# Patient Record
Sex: FEMALE | Race: WHITE | HISPANIC OR LATINO | URBAN - METROPOLITAN AREA
[De-identification: names, ages, dates, MRNs, and addresses within clinical notes are randomized per-mention and may not be internally consistent; named-entity substitution may affect disease eponyms.]

---

## 2021-04-09 ENCOUNTER — TELEMEDICINE (OUTPATIENT)
Dept: INTERNAL MEDICINE CLINIC | Facility: CLINIC | Age: 36
End: 2021-04-09

## 2021-04-09 DIAGNOSIS — R51.9 ACUTE NONINTRACTABLE HEADACHE, UNSPECIFIED HEADACHE TYPE: Primary | ICD-10-CM

## 2021-04-09 DIAGNOSIS — Z20.822 EXPOSURE TO COVID-19 VIRUS: ICD-10-CM

## 2021-04-09 DIAGNOSIS — R52 GENERALIZED BODY ACHES: ICD-10-CM

## 2021-04-09 DIAGNOSIS — R05.9 COUGH: ICD-10-CM

## 2021-04-09 DIAGNOSIS — R51.9 ACUTE NONINTRACTABLE HEADACHE, UNSPECIFIED HEADACHE TYPE: ICD-10-CM

## 2021-04-09 PROCEDURE — 99203 OFFICE O/P NEW LOW 30 MIN: CPT | Performed by: PHYSICIAN ASSISTANT

## 2021-04-09 PROCEDURE — U0005 INFEC AGEN DETEC AMPLI PROBE: HCPCS | Performed by: PHYSICIAN ASSISTANT

## 2021-04-09 PROCEDURE — U0003 INFECTIOUS AGENT DETECTION BY NUCLEIC ACID (DNA OR RNA); SEVERE ACUTE RESPIRATORY SYNDROME CORONAVIRUS 2 (SARS-COV-2) (CORONAVIRUS DISEASE [COVID-19]), AMPLIFIED PROBE TECHNIQUE, MAKING USE OF HIGH THROUGHPUT TECHNOLOGIES AS DESCRIBED BY CMS-2020-01-R: HCPCS | Performed by: PHYSICIAN ASSISTANT

## 2021-04-09 NOTE — PROGRESS NOTES
COVID-19 Outpatient Progress Note    Assessment/Plan:    Problem List Items Addressed This Visit     None      Visit Diagnoses     Acute nonintractable headache, unspecified headache type    -  Primary    Relevant Orders    Novel Coronavirus (Covid-19),PCR [facility] - Collected at [facility] or [facility]    Generalized body aches        Relevant Orders    Novel Coronavirus (Covid-19),PCR [facility] - Collected at [facility] or [facility]    Cough        Relevant Orders    Novel Coronavirus (Covid-19),PCR [facility] - Collected at [facility] or [facility]    Exposure to COVID-19 virus        Relevant Orders    Novel Coronavirus (Covid-19),PCR [facility] - Collected at [facility] or [facility]         Disposition:     I referred patient to one of our centralized sites for a COVID-19 swab  14-year-old female presenting today as a new patient for concern of COVID exposure and now with symptoms times 2-3 days  Based on our discussion there is higher suspicion for COVID-19 infection  I have recommended that patient go today to our centralized COVID testing site at [facility] to get tested  Procedure protocol for testing was discussed with patient and she expresses understanding, address of the testing site with given  In the meantime patient understands that she needs to remain home and quarantine as she is at high risk for COVID-19 with exposure  She can continue her medication/anti-inflammatory for her body aches and headache and I also reminded patient to rest and drink plenty of fluids to stay hydrated  She also needs to monitor her symptoms closely      Patient states that she has an appointment for her 1st vaccination for COVID-19 tomorrow and I did advise unfortunately that she canceled this appointment as the vaccine would be an ineffective at this time for this current exposure and possible infection but also we do not want her going out and possibly exposing other people as she needs to remain home and quarantine  Patient expresses understanding of this  We will plan to follow-up with patient when test results are received hopefully early next week  ER precautions were addressed over the weekend if symptoms suddenly worsen and she notes any significant breathing issues or other uncontrollable issues that she has concerns with  Otherwise we will determine further treatment plan once test results are reviewed  I have spent 18 minutes directly with the patient  Greater than 50% of this time was spent in counseling/coordination of care regarding: risks and benefits of treatment options, instructions for management, patient and family education, importance of treatment compliance, risk factor reductions and impressions  Encounter provider Lamont Vieira PA-C    Provider located at [id number] [place] [address] Road  [id number] [place] [address] 30  [place] [id number] [facility] [address] [phone]    Recent Visits  No visits were found meeting these conditions  Showing recent visits within past 7 days and meeting all other requirements     Today's Visits  Date Type Provider Dept   04/09/21 207 [facility], [address] [place] [address] today's visits and meeting all other requirements     Future Appointments  No visits were found meeting these conditions  Showing future appointments within next 150 days and meeting all other requirements      This virtual check-in was done via [employer] and patient was informed that this is a secure, HIPAA-compliant platform  She agrees to proceed  Patient agrees to participate in a virtual check in via telephone or video visit instead of presenting to the office to address urgent/immediate medical needs  Patient is aware this is a billable service  After connecting through [facility], the patient was identified by name and date of birth   Eugenia Alicia was informed that this was a telemedicine visit and that the exam was being conducted confidentially over secure lines  My office door was closed  No one else was in the room  The patient was notified the following individuals were present in the room: [facility]  telephone  used for visit today  Wendy Carmona acknowledged consent and understanding of privacy and security of the telemedicine visit  I informed the patient that I have reviewed her record in Epic and presented the opportunity for her to ask any questions regarding the visit today  The patient agreed to participate  Subjective:   Wendy Carmona is a 39 y o  female who is concerned about COVID-19  Patient's symptoms include chills, fatigue, sore throat, cough, myalgias and headache  Patient denies congestion, rhinorrhea, anosmia, loss of taste, shortness of breath, chest tightness, abdominal pain, nausea, vomiting and diarrhea  Date of symptom onset: 4/7/2021  Date of exposure: 4/6/2021    Exposure:   Contact with a person who is under investigation (PUI) for or who is positive for COVID-19 within the last 14 days?: Yes    Hospitalized recently for fever and/or lower respiratory symptoms?: No      Currently a healthcare worker that is involved in direct patient care?: No      Works in a special setting where the risk of COVID-19 transmission may be high? (this may include long-term care, correctional and prison facilities; homeless shelters; assisted-living facilities and group homes ): No      Resident in a special setting where the risk of COVID-19 transmission may be high? (this may include long-term care, correctional and prison facilities; homeless shelters; assisted-living facilities and group homes ): No      States was exposed to her family member who tested positive, found out yesterday, 4/8/2021 of the + test   GUS  IS A NEW PATIENT    Unable to check temp because does not have a thermometer  She is taking a medication naproxen for body pain and head pain       [facility], [facility] Masood Porras  History reviewed  No pertinent past medical history  History reviewed  No pertinent surgical history  No current outpatient medications on file  No current facility-administered medications for this visit  No Known Allergies    Review of Systems   Constitutional: Positive for chills and fatigue  HENT: Positive for sore throat  Negative for congestion and rhinorrhea  Respiratory: Positive for cough  Negative for chest tightness and shortness of breath  Gastrointestinal: Negative for abdominal pain, diarrhea, nausea and vomiting  Musculoskeletal: Positive for myalgias  Neurological: Positive for headaches  Objective:    Vitals:       Physical Exam  Constitutional:       General: She is not in acute distress  Appearance: She is ill-appearing (mild)  She is not toxic-appearing or diaphoretic  HENT:      Head: Normocephalic and atraumatic  Right Ear: External ear normal       Left Ear: External ear normal    Eyes:      General:         Right eye: No discharge  Left eye: No discharge  Conjunctiva/sclera: Conjunctivae normal    Neck:      Musculoskeletal: No pain with movement  Pulmonary:      Effort: Pulmonary effort is normal    Neurological:      Mental Status: She is alert and oriented to person, place, and time  Psychiatric:         Mood and Affect: Mood normal          Behavior: Behavior normal  Behavior is cooperative  VIRTUAL VISIT DISCLAIMER    Jeyson Sanders acknowledges that she has consented to an online visit or consultation  She understands that the online visit is based solely on information provided by her, and that, in the absence of a face-to-face physical evaluation by the physician, the diagnosis she receives is both limited and provisional in terms of accuracy and completeness  This is not intended to replace a full medical face-to-face evaluation by the physician   Jeyson Sanders understands and accepts these terms

## 2021-04-11 LAB — SARS-COV-2 RNA RESP QL NAA+PROBE: POSITIVE

## 2021-04-12 ENCOUNTER — TELEMEDICINE (OUTPATIENT)
Dept: INTERNAL MEDICINE CLINIC | Facility: CLINIC | Age: 36
End: 2021-04-12

## 2021-04-12 VITALS — TEMPERATURE: 97.3 F

## 2021-04-12 DIAGNOSIS — U07.1 COVID-19 VIRUS INFECTION: Primary | ICD-10-CM

## 2021-04-12 PROCEDURE — 99212 OFFICE O/P EST SF 10 MIN: CPT | Performed by: PHYSICIAN ASSISTANT

## 2021-04-12 NOTE — PROGRESS NOTES
COVID-19 Outpatient Progress Note    Assessment/Plan:    Problem List Items Addressed This Visit     None      Visit Diagnoses     COVID-19 virus infection    -  Primary         Disposition:     I recommended continued isolation until at least 24 hours have passed since recovery defined as resolution of fever without the use of fever-reducing medications AND improvement in COVID symptoms AND 10 days have passed since onset of symptoms (or 10 days have passed since date of first positive viral diagnostic test for asymptomatic patients)  Patient is a very pleasant 39-year-old female who tested positive for COVID on 04/09, exposure 4/6 and symptoms starting 4/7  Today she states that she is doing okay with symptoms as described in HPI however she does report some slight improvement overall  She has been quarantine to in her home appropriately, stating that all others in the home are positive for COVID as well  She declines any further treatment for her symptoms and will stick with Tylenol as needed for headache and pain control  Patient reminded of importance to rest as well as keeping hydrated with clear fluids  She declined any medication for her nasal congestion  Patient overall is stable and I do not see any further need for any other treatment at this time  Today is day 5 of symptoms and again we will continue to monitor  I will have my medical assistant reach out to patient to schedule her 2nd COVID follow-up in the next 48-72 hours  Patient was encouraged to contact our office with any sooner questions or sudden changes in symptoms  Patient expressed understanding of our discussion today  At next follow-up we will discuss prognosis as well as vaccination recommendations  I have spent 15 minutes directly with the patient   Greater than 50% of this time was spent in counseling/coordination of care regarding: diagnostic results, risks and benefits of treatment options, instructions for management, patient and family education, importance of treatment compliance and impressions  Encounter provider Laura Robin PA-C    Provider located at [address] [facility] [address]  [address] [place] [address] 30  [facility] [[email or web address]] [address] [facility] [address] [phone]    Recent Visits  Date Type Provider Dept   04/09/21 207 [facility], [facility] recent visits within past 7 days and meeting all other requirements     Today's Visits  Date Type Provider Dept   04/12/21 207 [facility], [address] [place] [address] today's visits and meeting all other requirements     Future Appointments  No visits were found meeting these conditions  Showing future appointments within next 150 days and meeting all other requirements      This virtual check-in was done via [employer] and patient was informed that this is a secure, HIPAA-compliant platform  She agrees to proceed  Patient agrees to participate in a virtual check in via telephone or video visit instead of presenting to the office to address urgent/immediate medical needs  Patient is aware this is a billable service  After connecting through [facility], the patient was identified by name and date of birth  Fransisco Crum was informed that this was a telemedicine visit and that the exam was being conducted confidentially over secure lines  My office door was closed  No one else was in the room  The patient was notified the following individuals were present in the room: [employer] telephone  used for todays visit  Fransisco Crum acknowledged consent and understanding of privacy and security of the telemedicine visit  I informed the patient that I have reviewed her record in Epic and presented the opportunity for her to ask any questions regarding the visit today  The patient agreed to participate  Subjective:    Fransisco Crum is a 39 y o  female who has been screened for COVID-19  Symptom change since last report: improving  Patient's symptoms include fatigue, nasal congestion, sore throat, anosmia, loss of taste, cough, chest tightness and headache  Patient denies fever, chills, rhinorrhea, shortness of breath, abdominal pain, nausea, vomiting and diarrhea  Cally Linares has been staying home and has isolated themselves in her home  She is taking care to not share personal items and is cleaning all surfaces that are touched often, like counters, tabletops, and doorknobs using household cleaning sprays or wipes  She is wearing a mask when she leaves her room  Date of symptom onset: 4/7/2021  Date of exposure: 4/6/2021  Date of positive COVID-19 PCR: 4/9/2021    She states everyone at the house is also positive  She is taking tylenol for sxs  Lab Results   Component Value Date    SARSCOV2 Positive (A) 04/09/2021     History reviewed  No pertinent past medical history  History reviewed  No pertinent surgical history  No current outpatient medications on file  No current facility-administered medications for this visit  No Known Allergies    Review of Systems   Constitutional: Positive for fatigue  Negative for chills and fever  HENT: Positive for congestion and sore throat  Negative for rhinorrhea  Respiratory: Positive for cough and chest tightness  Negative for shortness of breath  Gastrointestinal: Negative for abdominal pain, diarrhea, nausea and vomiting  Neurological: Positive for headaches  Objective:    Vitals:    04/12/21 0820   Temp: (!) 97 3 °F (36 3 °C)   TempSrc: Temporal       Physical Exam  Constitutional:       General: She is not in acute distress  Appearance: She is ill-appearing (mild)  She is not toxic-appearing  HENT:      Head: Normocephalic and atraumatic  Right Ear: External ear normal       Left Ear: External ear normal    Eyes:      General:         Right eye: No discharge  Left eye: No discharge  Conjunctiva/sclera: Conjunctivae normal    Neck:      Musculoskeletal: No pain with movement  Pulmonary:      Effort: Pulmonary effort is normal    Neurological:      Mental Status: She is alert and oriented to person, place, and time  Psychiatric:         Mood and Affect: Mood normal          Speech: Speech normal          Behavior: Behavior normal  Behavior is cooperative  VIRTUAL VISIT DISCLAIMER    Salena Noah acknowledges that she has consented to an online visit or consultation  She understands that the online visit is based solely on information provided by her, and that, in the absence of a face-to-face physical evaluation by the physician, the diagnosis she receives is both limited and provisional in terms of accuracy and completeness  This is not intended to replace a full medical face-to-face evaluation by the physician  Salena Zmaora understands and accepts these terms

## 2021-04-15 ENCOUNTER — TELEPHONE (OUTPATIENT)
Dept: INTERNAL MEDICINE CLINIC | Facility: CLINIC | Age: 36
End: 2021-04-15

## 2021-04-15 NOTE — TELEPHONE ENCOUNTER
Marlo - MA tried reaching patient for virtual appt, unsuccessful, left VM  I texted pt through [employer] and waited about 8minutes, then had [employer] telephone  attempt to call pt  Unable to reach pt, [facility]  leave detailed message that I was calling for her appt and asked she give office a call to reschedule  Please attempt to reach out to patient to reschedule appt if she does not call back       TY

## 2021-04-15 NOTE — TELEPHONE ENCOUNTER
Patient stated her phone was lost and could not find it  Phone was found and appt was rescheduled for 4/16/21

## 2021-04-16 ENCOUNTER — TELEMEDICINE (OUTPATIENT)
Dept: INTERNAL MEDICINE CLINIC | Facility: CLINIC | Age: 36
End: 2021-04-16

## 2021-04-16 DIAGNOSIS — R07.89 CHEST TIGHTNESS: ICD-10-CM

## 2021-04-16 DIAGNOSIS — U07.1 COVID-19 VIRUS INFECTION: Primary | ICD-10-CM

## 2021-04-16 DIAGNOSIS — R05.9 COUGH: ICD-10-CM

## 2021-04-16 PROCEDURE — 99214 OFFICE O/P EST MOD 30 MIN: CPT | Performed by: PHYSICIAN ASSISTANT

## 2021-04-16 RX ORDER — ACETAMINOPHEN 325 MG/1
650 TABLET ORAL EVERY 6 HOURS PRN
COMMUNITY
End: 2021-05-26

## 2021-04-16 RX ORDER — BENZONATATE 200 MG/1
200 CAPSULE ORAL 3 TIMES DAILY PRN
Qty: 30 CAPSULE | Refills: 0 | Status: SHIPPED | OUTPATIENT
Start: 2021-04-16 | End: 2021-05-26

## 2021-04-16 RX ORDER — PREDNISONE 20 MG/1
40 TABLET ORAL DAILY
Qty: 10 TABLET | Refills: 0 | Status: SHIPPED | OUTPATIENT
Start: 2021-04-16 | End: 2021-04-21

## 2021-04-16 NOTE — PROGRESS NOTES
COVID-19 Outpatient Progress Note    Assessment/Plan:    Problem List Items Addressed This Visit     None      Visit Diagnoses     COVID-19 virus infection    -  Primary    Relevant Medications    benzonatate (TESSALON) 200 MG capsule    predniSONE 20 mg tablet    Chest tightness        Relevant Medications    predniSONE 20 mg tablet    Cough        Relevant Medications    benzonatate (TESSALON) 200 MG capsule         Disposition:     I recommended continued isolation until at least 24 hours have passed since recovery defined as resolution of fever without the use of fever-reducing medications AND improvement in COVID symptoms AND 10 days have passed since onset of symptoms (or 10 days have passed since date of first positive viral diagnostic test for asymptomatic patients)  Patient is a pleasant 58-year-old female presenting today for her 2nd COVID follow-up  Today is day 9 from the reported start of her symptoms and day 7 from her positive test   Unfortunately patient is noting slightly worse cough and some tightness in her chest as well as new onset abdominal discomfort generalized as well as some diarrhea  However patient states overall she thinks she is slowly improving  Based on my assessment I do not feel that she needs any further evaluation in the emergency department or through the [facility], limited to the virtual video today  However, I would recommend a cough medicine and prednisone as prescribed above  She will continue Tylenol as needed  She can continue her inhaler from [place] as well as she feels it is helpful  Rest and fluids for hydration were all encouraged as well as continued efforts for isolation  Tentative last day of isolation set for Monday, 4/19/21  I have advised 1 more follow-up with patient to reassess her respiratory symptoms early next week before we clear her      I will discuss potential immunity, prognosis of COVID and the vaccination with her in more detail at next follow-up  Patient did have question regarding when she could get the vaccine which I told her she could look into getting scheduled her vaccination as soon as we clear her post infection  Patient understands the need to go to emergency department over the weekend should her respiratory symptoms continue to worsen despite treatment  Otherwise again we will contact her Monday or Tuesday for another virtual follow-up  I have spent 18 minutes directly with the patient  Greater than 50% of this time was spent in counseling/coordination of care regarding: diagnostic results, prognosis, risks and benefits of treatment options, instructions for management, patient and family education, importance of treatment compliance and impressions  Encounter provider Kelyl Carlos PA-C    Provider located at [id number] [place] [address] Road  [address] [place] [address] [address] [facility] [id number] [phone]    Recent Visits  Date Type Provider Dept   04/15/21 Telephone Kelly Carlos, [facility]   04/12/21 207 [facility], [facility]   04/09/21 207 [facility], [address] [place] [address] recent visits within past 7 days and meeting all other requirements     Today's Visits  Date Type Provider Dept   04/16/21 207 [facility], [address] [place] [address] today's visits and meeting all other requirements     Future Appointments  No visits were found meeting these conditions  Showing future appointments within next 150 days and meeting all other requirements      This virtual check-in was done via [employer] and patient was informed that this is a secure, HIPAA-compliant platform  She agrees to proceed  Patient agrees to participate in a virtual check in via telephone or video visit instead of presenting to the office to address urgent/immediate medical needs   Patient is aware this is a billable service  After connecting through [facility], the patient was identified by name and date of birth  Jose Juan Salazar was informed that this was a telemedicine visit and that the exam was being conducted confidentially over secure lines  My office door was closed  No one else was in the room  The patient was notified the following individuals were present in the room: [employer] telephone  used for today's appt  Jose Juan Salazar acknowledged consent and understanding of privacy and security of the telemedicine visit  I informed the patient that I have reviewed her record in Epic and presented the opportunity for her to ask any questions regarding the visit today  The patient agreed to participate  Subjective: Jose Juan Salazar is a 39 y o  female who has been screened for COVID-19  Symptom change since last report: improving  Patient's symptoms include fatigue, anosmia, loss of taste, cough, shortness of breath, chest tightness, abdominal pain (entire abdomen), diarrhea, myalgias (mild) and headache (daily)  Patient denies fever, chills, congestion, rhinorrhea, sore throat, nausea and vomiting  David Goss has been staying home and has isolated themselves in her home  She is taking care to not share personal items and is cleaning all surfaces that are touched often, like counters, tabletops, and doorknobs using household cleaning sprays or wipes  She is wearing a mask when she leaves her room  Date of symptom onset: 4/7/2021  Date of exposure: 4/6/2021  Date of positive COVID-19 PCR: 4/9/2021    She is currently using  Tylenol  She also is using an inhaler - betamethasone (from her counter of [employer])  She does find the inhaler is helping  Lab Results   Component Value Date    SARSCOV2 Positive (A) 04/09/2021     History reviewed  No pertinent past medical history  History reviewed  No pertinent surgical history    Current Outpatient Medications   Medication Sig Dispense Refill    acetaminophen (TYLENOL) 325 mg tablet Take 650 mg by mouth every 6 (six) hours as needed for mild pain      benzonatate (TESSALON) 200 MG capsule Take 1 capsule (200 mg total) by mouth 3 (three) times a day as needed for cough 30 capsule 0    predniSONE 20 mg tablet Take 2 tablets (40 mg total) by mouth daily for 5 days 10 tablet 0     No current facility-administered medications for this visit  No Known Allergies    Review of Systems   Constitutional: Positive for fatigue  Negative for chills and fever  HENT: Negative for congestion, rhinorrhea and sore throat  Respiratory: Positive for cough, chest tightness and shortness of breath  Gastrointestinal: Positive for abdominal pain (entire abdomen) and diarrhea  Negative for nausea and vomiting  Musculoskeletal: Positive for myalgias (mild)  Neurological: Positive for headaches (daily)  Objective: There were no vitals filed for this visit  Physical Exam  Constitutional:       General: She is not in acute distress  Appearance: She is ill-appearing (mild)  She is not toxic-appearing  HENT:      Head: Normocephalic and atraumatic  Right Ear: External ear normal       Left Ear: External ear normal    Eyes:      Conjunctiva/sclera: Conjunctivae normal    Neck:      Musculoskeletal: No pain with movement  Pulmonary:      Effort: No tachypnea, bradypnea, accessory muscle usage or respiratory distress  Comments: Intermittent dry hacking cough  No obvious respiratory distress, talking normally  Neurological:      Mental Status: She is alert and oriented to person, place, and time  Psychiatric:         Mood and Affect: Mood normal          Behavior: Behavior normal  Behavior is cooperative  VIRTUAL VISIT DISCLAIMER    Thai Myers acknowledges that she has consented to an online visit or consultation   She understands that the online visit is based solely on information provided by her, and that, in the absence of a face-to-face physical evaluation by the physician, the diagnosis she receives is both limited and provisional in terms of accuracy and completeness  This is not intended to replace a full medical face-to-face evaluation by the physician  Sadi Vital understands and accepts these terms

## 2021-04-19 ENCOUNTER — TELEMEDICINE (OUTPATIENT)
Dept: INTERNAL MEDICINE CLINIC | Facility: CLINIC | Age: 36
End: 2021-04-19

## 2021-04-19 ENCOUNTER — TELEPHONE (OUTPATIENT)
Dept: INTERNAL MEDICINE CLINIC | Facility: CLINIC | Age: 36
End: 2021-04-19

## 2021-04-19 DIAGNOSIS — U07.1 COVID-19 VIRUS INFECTION: Primary | ICD-10-CM

## 2021-04-19 PROCEDURE — 99212 OFFICE O/P EST SF 10 MIN: CPT | Performed by: PHYSICIAN ASSISTANT

## 2021-04-19 NOTE — TELEPHONE ENCOUNTER
Clerical staff  Pt seen for covid telemedicine visits, never seen in office  Pt agreeable to having non-urgent general preventative physical as a new patient in office in the next month or 2  Please reach out to schedule this   TY

## 2021-04-19 NOTE — PROGRESS NOTES
COVID-19 Outpatient Progress Note    Assessment/Plan:    Problem List Items Addressed This Visit        Other    RESOLVED: COVID-19 virus infection - Primary         Disposition:     Pleasant 39-year-old female presenting today for another COVID follow-up as she had reported a worsening cough and some chest tightness on day 9 from the reported start of her symptoms, day 7 from her positive test   Patient prescribed benzonatate and prednisone and today she states she is feeling much better with mild residual intermittent dry cough, chest tightness resolved  She has no other symptoms  Based on my assessment, today is day 10 from her positive test, so today will be her last day of isolation  Patient expresses understanding  Discussed with patient potential for immunity which can last up to 90 days post infection  She expresses understanding that it is less likely to be reinfected within this 90 day window  However, she understands the need to continue to comply with CDC recommendations to stop spread of COVID including wearing face mask appropriately, social distancing, germ precautions and proper hand hygiene  Patient expresses understanding that COVID vaccine is highly recommended at any time following her isolation and she states that she does not need recommendations of where she could get scheduled for the vaccine  No further follow-up necessary  However we have not seen her for new patient visit here in the office  Patient agreeable to getting scheduled for an annual physical as a new patient within the next 1-2 months and I will have our clerical staff contact her to arrange for this visit  She is to call sooner with any concerns  I have spent 10 minutes directly with the patient   Greater than 50% of this time was spent in counseling/coordination of care regarding: diagnostic results, prognosis, risks and benefits of treatment options, instructions for management, patient and family education, importance of treatment compliance, risk factor reductions and impressions  Encounter provider Omer Arriola PA-C    Provider located at [address] [facility] [address]  [id number] [place] [address] [address] [facility] [id number] [phone]    Recent Visits  Date Type Provider Dept   04/16/21 Telemedicine Jadielaure Arriola, [facility]   04/15/21 Telephone Jadielaure Mirandar, [facility]   04/12/21 [address] [facility], [address] [place] [address] recent visits within past 7 days and meeting all other requirements     Today's Visits  Date Type Provider Dept   04/19/21 Telephone Omer Curranvandanar, [address]   04/19/21 [address] [facility], [address] [place] [address] today's visits and meeting all other requirements     Future Appointments  No visits were found meeting these conditions  Showing future appointments within next 150 days and meeting all other requirements      This virtual check-in was done via [employer] and patient was informed that this is a secure, HIPAA-compliant platform  She agrees to proceed  Patient agrees to participate in a virtual check in via telephone or video visit instead of presenting to the office to address urgent/immediate medical needs  Patient is aware this is a billable service  After connecting through [facility], the patient was identified by name and date of birth  Thai Myers was informed that this was a telemedicine visit and that the exam was being conducted confidentially over secure lines  My office door was closed  No one else was in the room  The patient was notified the following individuals were present in the room: [employer] telephone  used for todays visit  Thai Myers acknowledged consent and understanding of privacy and security of the telemedicine visit   I informed the patient that I have reviewed her record in [address] [place] [address] and presented the opportunity for her to ask any questions regarding the visit today  The patient agreed to participate  Subjective: Colton Smith is a 39 y o  female who has been screened for COVID-19  Symptom change since last report: resolving  Patient is currently asymptomatic  Patient's symptoms include cough (mild, significantly improved)  Patient denies fever, chills, fatigue, malaise, congestion, rhinorrhea, sore throat, anosmia, loss of taste, shortness of breath, chest tightness, abdominal pain, nausea, vomiting, diarrhea, myalgias and headaches  Cally Linares has been staying home and has isolated themselves in her home  She is taking care to not share personal items and is cleaning all surfaces that are touched often, like counters, tabletops, and doorknobs using household cleaning sprays or wipes  She is wearing a mask when she leaves her room  Date of symptom onset: 4/7/2021  Date of exposure: 4/6/2021  Date of positive COVID-19 PCR: 4/9/2021    Last telemed f/u 4/16 and pt reporting some coughing and chest tightness  Prednisone and benzonatate cough pills prescribed  Lab Results   Component Value Date    SARSCOV2 Positive (A) 04/09/2021     Past Medical History:   Diagnosis Date    COVID-19 virus infection 4/19/2021     History reviewed  No pertinent surgical history  Current Outpatient Medications   Medication Sig Dispense Refill    benzonatate (TESSALON) 200 MG capsule Take 1 capsule (200 mg total) by mouth 3 (three) times a day as needed for cough 30 capsule 0    predniSONE 20 mg tablet Take 2 tablets (40 mg total) by mouth daily for 5 days 10 tablet 0    acetaminophen (TYLENOL) 325 mg tablet Take 650 mg by mouth every 6 (six) hours as needed for mild pain       No current facility-administered medications for this visit  No Known Allergies    Review of Systems   Constitutional: Negative for chills, fatigue and fever     HENT: Negative for congestion, rhinorrhea and sore throat  Respiratory: Positive for cough (mild, significantly improved)  Negative for chest tightness and shortness of breath  Gastrointestinal: Negative for abdominal pain, diarrhea, nausea and vomiting  Musculoskeletal: Negative for myalgias  Neurological: Negative for headaches  Objective:    Vitals:       Physical Exam  Constitutional:       General: She is not in acute distress  Appearance: She is not ill-appearing or toxic-appearing  HENT:      Head: Normocephalic and atraumatic  Eyes:      Conjunctiva/sclera: Conjunctivae normal    Neck:      Musculoskeletal: No pain with movement  Trachea: Phonation normal    Pulmonary:      Effort: Pulmonary effort is normal    Neurological:      Mental Status: She is alert and oriented to person, place, and time  Psychiatric:         Mood and Affect: Mood normal          Speech: Speech normal          Behavior: Behavior normal  Behavior is cooperative  VIRTUAL VISIT DISCLAIMER    Evert Telma acknowledges that she has consented to an online visit or consultation  She understands that the online visit is based solely on information provided by her, and that, in the absence of a face-to-face physical evaluation by the physician, the diagnosis she receives is both limited and provisional in terms of accuracy and completeness  This is not intended to replace a full medical face-to-face evaluation by the physician  Evert Hollis understands and accepts these terms

## 2021-05-26 ENCOUNTER — OFFICE VISIT (OUTPATIENT)
Dept: INTERNAL MEDICINE CLINIC | Facility: CLINIC | Age: 36
End: 2021-05-26

## 2021-05-26 VITALS
TEMPERATURE: 98 F | SYSTOLIC BLOOD PRESSURE: 112 MMHG | BODY MASS INDEX: 31.41 KG/M2 | WEIGHT: 184 LBS | HEIGHT: 64 IN | HEART RATE: 74 BPM | DIASTOLIC BLOOD PRESSURE: 70 MMHG

## 2021-05-26 DIAGNOSIS — Z13.0 SCREENING FOR DEFICIENCY ANEMIA: ICD-10-CM

## 2021-05-26 DIAGNOSIS — Z00.00 ROUTINE ADULT HEALTH MAINTENANCE: Primary | ICD-10-CM

## 2021-05-26 DIAGNOSIS — E66.09 CLASS 1 OBESITY DUE TO EXCESS CALORIES WITHOUT SERIOUS COMORBIDITY WITH BODY MASS INDEX (BMI) OF 31.0 TO 31.9 IN ADULT: ICD-10-CM

## 2021-05-26 DIAGNOSIS — E03.9 HYPOTHYROIDISM, UNSPECIFIED TYPE: ICD-10-CM

## 2021-05-26 DIAGNOSIS — Z13.1 SCREENING FOR DIABETES MELLITUS: ICD-10-CM

## 2021-05-26 DIAGNOSIS — Z01.419 ROUTINE GYNECOLOGICAL EXAMINATION: ICD-10-CM

## 2021-05-26 DIAGNOSIS — Z13.220 SCREENING, LIPID: ICD-10-CM

## 2021-05-26 PROCEDURE — 99385 PREV VISIT NEW AGE 18-39: CPT | Performed by: PHYSICIAN ASSISTANT

## 2021-05-26 RX ORDER — LORATADINE 10 MG/1
10 TABLET ORAL DAILY
COMMUNITY

## 2021-05-26 NOTE — PROGRESS NOTES
Assessment/Plan:      Diagnoses and all orders for this visit:    Routine adult health maintenance    Routine gynecological examination  -     Ambulatory referral to Obstetrics / Gynecology; Future    Hypothyroidism, unspecified type  -     TSH, 3rd generation with Free T4 reflex; Future    Class 1 obesity due to excess calories without serious comorbidity with body mass index (BMI) of 31 0 to 31 9 in adult    Screening for deficiency anemia  -     CBC and differential; Future    Screening for diabetes mellitus  -     Comprehensive metabolic panel; Future    Screening, lipid  -     Lipid panel; Future    Other orders  -     loratadine (CLARITIN) 10 mg tablet; Take 10 mg by mouth daily      patient is a 65-year-old female who is presenting to me today to establish with our office, previously seen in recent past for telemedicine visit for COVID-19 infection  She has noted intermittent dry cough but no other symptoms  I would recommend she consider starting taking Claritin every day instead of p r n  As this may be allergy related  She can use cough medicine such as Delsym OTC as needed and recommend staying hydrated  Her lungs are clear on examination today and I do not see any other need for respiratory intervention but do recommend continued monitoring  Patient does note a history of hypothyroidism but has intermittently been using thyroid medication that was prescribed in [facility]  She states that she has not had any routine testing to follow-up for her thyroid in about 3 years and last had thyroid medication approximately November 2020  She is not complaining of any current symptoms  I would recommend updating TSH and free T4 before we advised starting medication again  Patient is agreeable  BMI addressed today currently 31 58    Patient understands concern of risks associated with obesity which include increased risk for certain cancers, chronic pain as well as heart disease, stroke and heart attack as well as diabetes  Patient understands healthy BMI less than 25  Recommendations for diet changes and exercise as documented below  PHQ is negative today  Routine lab work including CBC, CMP and lipids ordered in addition to her thyroid blood test   HIV screening deferred today since patient is self pay however will consider addressing this at a later time when I have more time to discuss other options for testing through free STD clinic  Information given for patient to schedule dental and eye examination  She will be scheduled for her COVID vaccine before leaving the office today  Therefore will defer Tdap to next appointment  Referral provided for gyn for routine care as well as consideration for cervical cancer screening  For now we will have patient follow-up on an as-needed basis, recommended yearly for annual physical     [employer] telephone  used for todays appt  Chief Complaint   Patient presents with    Establish Care     cough from having covid a month ago           Subjective:     Patient ID: Santiago Ratliff is a 39 y o  female  35y/o female here today for establishment with office as NP, only seen for telemedicine visits for COVID  Since COVID + test April 9, she has been feeling well, slight intermittent dry cough  She does get some tightness in chest with coughing  She is taking claritin prn  She notes hx of hypothyroidism 2004, doesn't take medication for it  Was being tx for it through doctor in [facility], and has been off and on medication  Has been off medication completely now since nov 2020  Otherwise she states she is in good health  No specific concerns today  Last dental cleaning - about 6 years ago  Brushes teeth twice a day  She does note filling in tooth that fell out recently  Last eye exam - 3 years ago  she uses glasses "to rest eyes"  She considers her diet "normal" she doesn't really watch what she eats   She does not eat fruit or vegetables daily  She eats sandwiches, corn tortillas with meat/beef, juices, rice  She drinks 3-4 bottles of water a day  She does not formally exercise  She does not take any vitamins  She does not have a GYN  Thinks last PAP was about 5 years ago  She has been pregnant 1 times with 1 child  LMP:  5/8/2021  States her periods are regular, every 28-30 days, last 5 days  She is sexually active intermittently  She is in relationship with 1 partner monogamously  No birth control  She lives in a house with child and   She feels safe in her home  Feels effie in realationship with   Smoke alarms UTD  Wears seatbelt in the car  Tobacco - denies  ETOH - social  Drugs - denies past or present            Review of Systems   Constitutional: Negative  HENT: Negative  Eyes: Negative  Respiratory:        As in HPI   Cardiovascular: Negative  Gastrointestinal: Negative  Genitourinary: Negative  Musculoskeletal: Negative  Skin: Negative  Neurological: Negative  Psychiatric/Behavioral: Negative  The following portions of the patient's history were reviewed and updated as appropriate: allergies, current medications, past family history, past medical history, past social history, past surgical history and problem list       Objective:     Physical Exam  Vitals signs reviewed  Constitutional:       General: She is not in acute distress  Appearance: She is obese  She is not ill-appearing or toxic-appearing  HENT:      Head: Normocephalic and atraumatic  Right Ear: Tympanic membrane, ear canal and external ear normal       Left Ear: Tympanic membrane, ear canal and external ear normal    Eyes:      General:         Right eye: No discharge  Left eye: No discharge  Extraocular Movements: Extraocular movements intact  Conjunctiva/sclera: Conjunctivae normal       Pupils: Pupils are equal, round, and reactive to light     Neck: Musculoskeletal: Neck supple  Thyroid: No thyroid tenderness  Vascular: Normal carotid pulses  No carotid bruit  Trachea: Phonation normal    Cardiovascular:      Rate and Rhythm: Normal rate and regular rhythm  Pulses: Normal pulses  Heart sounds: Normal heart sounds  Pulmonary:      Effort: Pulmonary effort is normal       Breath sounds: Normal breath sounds  Abdominal:      General: Abdomen is protuberant  Bowel sounds are normal       Palpations: Abdomen is soft  Tenderness: There is no abdominal tenderness  Musculoskeletal:      Right lower leg: No edema  Left lower leg: No edema  Comments: Neck, spine, upper and lower extremities appearing normal to inspection without redness, swelling, ecchymosis or rash  Gross normal movement and strength of extremities without any obvious pain or limitation  Lymphadenopathy:      Head:      Right side of head: No submandibular or tonsillar adenopathy  Left side of head: No submandibular or tonsillar adenopathy  Neurological:      Mental Status: She is alert and oriented to person, place, and time  Coordination: Coordination is intact  Gait: Gait is intact  Deep Tendon Reflexes:      Reflex Scores:       Brachioradialis reflexes are 2+ on the right side and 2+ on the left side  Patellar reflexes are 2+ on the right side and 2+ on the left side  Psychiatric:         Mood and Affect: Mood normal          Behavior: Behavior normal  Behavior is cooperative             Vitals:    05/26/21 1510   BP: 112/70   BP Location: Right arm   Patient Position: Sitting   Cuff Size: Large   Pulse: 74   Temp: 98 °F (36 7 °C)   TempSrc: Temporal   Weight: 83 5 kg (184 lb)   Height: 5' 4" (1 626 m)     PHQ-9 Depression Screening    PHQ-9:   Frequency of the following problems over the past two weeks:      Little interest or pleasure in doing things: 0 - not at all  Feeling down, depressed, or hopeless: 0 - not at all  PHQ-2 Score: 0       BMI Counseling: Body mass index is 31 58 kg/m²  The BMI is above normal  Nutrition recommendations include reducing portion sizes, decreasing overall calorie intake, 3-5 servings of fruits/vegetables daily, reducing fast food intake, consuming healthier snacks, decreasing soda and/or juice intake, moderation in carbohydrate intake, increasing intake of lean protein, reducing intake of saturated fat and trans fat and reducing intake of cholesterol  Exercise recommendations include exercising 3-5 times per week

## 2021-06-21 ENCOUNTER — OFFICE VISIT (OUTPATIENT)
Dept: OBGYN CLINIC | Facility: CLINIC | Age: 36
End: 2021-06-21

## 2021-06-21 VITALS
DIASTOLIC BLOOD PRESSURE: 74 MMHG | HEART RATE: 81 BPM | HEIGHT: 64 IN | WEIGHT: 182 LBS | BODY MASS INDEX: 31.07 KG/M2 | SYSTOLIC BLOOD PRESSURE: 132 MMHG

## 2021-06-21 DIAGNOSIS — Z01.419 ROUTINE GYNECOLOGICAL EXAMINATION: Primary | ICD-10-CM

## 2021-06-21 DIAGNOSIS — Z12.4 SCREENING FOR CERVICAL CANCER: ICD-10-CM

## 2021-06-21 PROCEDURE — 99385 PREV VISIT NEW AGE 18-39: CPT | Performed by: OBSTETRICS & GYNECOLOGY

## 2021-06-21 PROCEDURE — G0145 SCR C/V CYTO,THINLAYER,RESCR: HCPCS | Performed by: OBSTETRICS & GYNECOLOGY

## 2021-06-21 NOTE — PROGRESS NOTES
[address] [facility]  Name: Salena Zamora  MRN: [id number]  : 1985      SUBJECTIVE:     Salena Zamora is a 39 y o  female who presents for annual well woman exam  Periods are regular every 28-30 days, lasting 5 days  No intermenstrual bleeding, spotting, or discharge  GYN:  · No vaginal discharge, labial erythema or lesions, dyspareunia  · Contraception: tubal ligation  · Patient is sexually active with 1 partner  · Cannot recall last pap, but denies any abnormal hx    OB:  · Prior     :  · No dysuria, urinary frequency or urgency  · No hematuria, flank pain, incontinence  Breast:  · No breast mass, skin changes, dimpling, reddening, nipple retraction  · No breast discharge  · Patient does not have a family history of breast, endometrial, or ovarian ca  General:  · Diet: discussed balanced diet  · Exercise: recommend 150mins exercise/week  · ETOH use: social drinking  · Tobacco use: none  · Recreational drug use: none    Screening:  · STD screening: declines  Review of Systems  Pertinent items are noted in HPI          OBJECTIVE:      /74   Pulse 81   Ht 5' 4" (1 626 m)   Wt 82 6 kg (182 lb)   LMP 2021   BMI 31 24 kg/m²     General:   alert, appears stated age and cooperative   Heart: regular rate   Lungs: no respiratory distress, normal effort   Breast: breasts appear normal, no suspicious masses, no skin or nipple changes or axillary nodes   Abdomen: soft, non-tender, without masses or organomegaly   Vulva: normal   Vagina: normal mucosa, normal discharge   Cervix: multiparous appearance and no cervical motion tenderness   Uterus: normal size, mobile, non-tender   Adnexa: normal adnexa and no mass, fullness, tenderness          ASSESSMENT/PLAN:  - Pap smear collected today  - RTC in 1 year for annual exam      Shauna Coyne MD  OB/GYN PGY-3  2021

## 2021-06-24 LAB
LAB AP GYN PRIMARY INTERPRETATION: NORMAL
Lab: NORMAL

## 2021-06-28 ENCOUNTER — TELEPHONE (OUTPATIENT)
Dept: OBGYN CLINIC | Facility: CLINIC | Age: 36
End: 2021-06-28

## 2021-06-28 NOTE — TELEPHONE ENCOUNTER
----- Message from Astrid Holstein, MD sent at 6/25/2021 11:35 PM EDT -----  Pap smear was negative, HPV was not done so she will need repeat pap in 3 years

## 2021-08-28 ENCOUNTER — APPOINTMENT (OUTPATIENT)
Dept: LAB | Facility: HOSPITAL | Age: 36
End: 2021-08-28

## 2021-08-28 DIAGNOSIS — E03.9 HYPOTHYROIDISM, UNSPECIFIED TYPE: ICD-10-CM

## 2021-08-28 DIAGNOSIS — Z13.1 SCREENING FOR DIABETES MELLITUS: ICD-10-CM

## 2021-08-28 DIAGNOSIS — Z13.0 SCREENING FOR DEFICIENCY ANEMIA: ICD-10-CM

## 2021-08-28 DIAGNOSIS — Z13.220 SCREENING, LIPID: ICD-10-CM

## 2021-08-28 LAB
ALBUMIN SERPL BCP-MCNC: 3.7 G/DL (ref 3.5–5)
ALP SERPL-CCNC: 70 U/L (ref 46–116)
ALT SERPL W P-5'-P-CCNC: 26 U/L (ref 12–78)
ANION GAP SERPL CALCULATED.3IONS-SCNC: 5 MMOL/L (ref 4–13)
AST SERPL W P-5'-P-CCNC: 14 U/L (ref 5–45)
BASOPHILS # BLD AUTO: 0.07 THOUSANDS/ΜL (ref 0–0.1)
BASOPHILS NFR BLD AUTO: 1 % (ref 0–1)
BILIRUB SERPL-MCNC: 0.35 MG/DL (ref 0.2–1)
BUN SERPL-MCNC: 19 MG/DL (ref 5–25)
CALCIUM SERPL-MCNC: 8.9 MG/DL (ref 8.3–10.1)
CHLORIDE SERPL-SCNC: 109 MMOL/L (ref 100–108)
CHOLEST SERPL-MCNC: 207 MG/DL (ref 50–200)
CO2 SERPL-SCNC: 24 MMOL/L (ref 21–32)
CREAT SERPL-MCNC: 0.63 MG/DL (ref 0.6–1.3)
EOSINOPHIL # BLD AUTO: 0.35 THOUSAND/ΜL (ref 0–0.61)
EOSINOPHIL NFR BLD AUTO: 4 % (ref 0–6)
ERYTHROCYTE [DISTWIDTH] IN BLOOD BY AUTOMATED COUNT: 13.7 % (ref 11.6–15.1)
GFR SERPL CREATININE-BSD FRML MDRD: 115 ML/MIN/1.73SQ M
GLUCOSE P FAST SERPL-MCNC: 85 MG/DL (ref 65–99)
HCT VFR BLD AUTO: 38.1 % (ref 34.8–46.1)
HDLC SERPL-MCNC: 41 MG/DL
HGB BLD-MCNC: 12.3 G/DL (ref 11.5–15.4)
IMM GRANULOCYTES # BLD AUTO: 0.03 THOUSAND/UL (ref 0–0.2)
IMM GRANULOCYTES NFR BLD AUTO: 0 % (ref 0–2)
LDLC SERPL CALC-MCNC: 145 MG/DL (ref 0–100)
LYMPHOCYTES # BLD AUTO: 2.97 THOUSANDS/ΜL (ref 0.6–4.47)
LYMPHOCYTES NFR BLD AUTO: 34 % (ref 14–44)
MCH RBC QN AUTO: 28.9 PG (ref 26.8–34.3)
MCHC RBC AUTO-ENTMCNC: 32.3 G/DL (ref 31.4–37.4)
MCV RBC AUTO: 89 FL (ref 82–98)
MONOCYTES # BLD AUTO: 0.55 THOUSAND/ΜL (ref 0.17–1.22)
MONOCYTES NFR BLD AUTO: 6 % (ref 4–12)
NEUTROPHILS # BLD AUTO: 4.85 THOUSANDS/ΜL (ref 1.85–7.62)
NEUTS SEG NFR BLD AUTO: 55 % (ref 43–75)
NONHDLC SERPL-MCNC: 166 MG/DL
NRBC BLD AUTO-RTO: 0 /100 WBCS
PLATELET # BLD AUTO: 344 THOUSANDS/UL (ref 149–390)
PMV BLD AUTO: 10.9 FL (ref 8.9–12.7)
POTASSIUM SERPL-SCNC: 3.7 MMOL/L (ref 3.5–5.3)
PROT SERPL-MCNC: 7.2 G/DL (ref 6.4–8.2)
RBC # BLD AUTO: 4.26 MILLION/UL (ref 3.81–5.12)
SODIUM SERPL-SCNC: 138 MMOL/L (ref 136–145)
T4 FREE SERPL-MCNC: 0.61 NG/DL (ref 0.76–1.46)
TRIGL SERPL-MCNC: 104 MG/DL
TSH SERPL DL<=0.05 MIU/L-ACNC: 10.5 UIU/ML (ref 0.36–3.74)
WBC # BLD AUTO: 8.82 THOUSAND/UL (ref 4.31–10.16)

## 2021-08-28 PROCEDURE — 36415 COLL VENOUS BLD VENIPUNCTURE: CPT

## 2021-08-28 PROCEDURE — 84443 ASSAY THYROID STIM HORMONE: CPT

## 2021-08-28 PROCEDURE — 85025 COMPLETE CBC W/AUTO DIFF WBC: CPT

## 2021-08-28 PROCEDURE — 84439 ASSAY OF FREE THYROXINE: CPT

## 2021-08-28 PROCEDURE — 80053 COMPREHEN METABOLIC PANEL: CPT

## 2021-08-28 PROCEDURE — 80061 LIPID PANEL: CPT

## 2021-09-14 ENCOUNTER — OFFICE VISIT (OUTPATIENT)
Dept: INTERNAL MEDICINE CLINIC | Facility: CLINIC | Age: 36
End: 2021-09-14

## 2021-09-14 VITALS
HEART RATE: 92 BPM | DIASTOLIC BLOOD PRESSURE: 67 MMHG | SYSTOLIC BLOOD PRESSURE: 118 MMHG | BODY MASS INDEX: 30.82 KG/M2 | HEIGHT: 65 IN | TEMPERATURE: 97.2 F | WEIGHT: 185 LBS

## 2021-09-14 DIAGNOSIS — M54.42 ACUTE BILATERAL LOW BACK PAIN WITH BILATERAL SCIATICA: Primary | ICD-10-CM

## 2021-09-14 DIAGNOSIS — Z11.59 NEED FOR HEPATITIS C SCREENING TEST: ICD-10-CM

## 2021-09-14 DIAGNOSIS — E03.9 HYPOTHYROIDISM, UNSPECIFIED TYPE: ICD-10-CM

## 2021-09-14 DIAGNOSIS — Z11.4 ENCOUNTER FOR SCREENING FOR HUMAN IMMUNODEFICIENCY VIRUS (HIV): ICD-10-CM

## 2021-09-14 DIAGNOSIS — M54.41 ACUTE BILATERAL LOW BACK PAIN WITH BILATERAL SCIATICA: Primary | ICD-10-CM

## 2021-09-14 PROCEDURE — 99213 OFFICE O/P EST LOW 20 MIN: CPT | Performed by: PHYSICIAN ASSISTANT

## 2021-09-14 RX ORDER — LEVOTHYROXINE SODIUM 0.05 MG/1
50 TABLET ORAL DAILY
Qty: 30 TABLET | Refills: 2 | Status: SHIPPED | OUTPATIENT
Start: 2021-09-14

## 2021-09-14 RX ORDER — CYCLOBENZAPRINE HCL 10 MG
10 TABLET ORAL
Qty: 30 TABLET | Refills: 0 | Status: SHIPPED | OUTPATIENT
Start: 2021-09-14

## 2021-09-14 RX ORDER — MELOXICAM 15 MG/1
15 TABLET ORAL DAILY
Qty: 30 TABLET | Refills: 0 | Status: SHIPPED | OUTPATIENT
Start: 2021-09-14

## 2021-09-14 NOTE — PROGRESS NOTES
Assessment/Plan:      Diagnoses and all orders for this visit:    Acute bilateral low back pain with bilateral sciatica  -     XR spine lumbar minimum 4 views non injury; Future  -     meloxicam (MOBIC) 15 mg tablet; Take 1 tablet (15 mg total) by mouth daily  -     cyclobenzaprine (FLEXERIL) 10 mg tablet; Take 1 tablet (10 mg total) by mouth daily at bedtime    Hypothyroidism, unspecified type  -     levothyroxine 50 mcg tablet; Take 1 tablet (50 mcg total) by mouth daily Take first thing in the AM with water; wait 30-60min before food/drink/medications  -     TSH, 3rd generation with Free T4 reflex; Future    Need for hepatitis C screening test  -     Hepatitis C antibody; Future    Encounter for screening for human immunodeficiency virus (HIV)  -     HIV 1/2 Antigen/Antibody (4th Generation) w Reflex [facility]; Future      50-year-old female presenting today for most concern of low back pain x2 months without any known trauma but 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 who advised that she see her PCP for management  She is not demonstrating any significant weakness or neurological abnormalities on exam though does report radiation of pain into bilateral legs with difficulty walking at times  Therefore I will order lumbar x-ray to further evaluate her symptoms  In the meantime I will prescribe meloxicam once a day in the morning for back pain control to be taking with food, Flexeril 10 mg at night before bed to relax muscles  I did explain to patient the importance of stretching on a daily basis considering her physical job but should definitely stretch her lower back, her neck in her legs before and after work every single day to improve flexibility and stretch her spine in muscles and help with pain  If this does not help, depending on x-ray results, will consider formal referral to physical therapy  Patient expresses understanding        I did take notice that patient told medical assistant she was not taking levothyroxine 50 mcg  I just started patient on this medication about a week ago for underactive thyroid with TSH 10 5, free T4 0 61  She did note at a prior visit with me that she was [place] but stopped her medicine at some point  Patient was not aware I sent in the medication so I did advise her to get this started when she could as soon as possible  I did give her updated thyroid blood work to get done in about 2 months  I also include HIV and hep C screening  We will plan to see patient back in about 3 months to follow-up for her thyroid condition and her back  Certainly we will call her sooner with x-ray results and determine further treatment plan and if physical therapy is warranted  She will call sooner if there are any more urgent concerns  [employer] video  used for visit today  Chief Complaint   Patient presents with    Lower back pain     For the past two months  Per patient on last appt she had it but mild and now is getting strong and routing to her left leg  Patient inform in two ocassion she was about to have an step and feels stock  Subjective:     Patient ID: Zafar Lin is a 39 y o  female  37y/o female here today for 2 months of low back pain  States she works in construction so she attributes pain to her work but no specific  States she did report it to manager but reports it is a small company family owned and they told her to see her PCP  States pain located across entire low back, but sometimes radiates into her feet B/L, worse on the left side than the right  States pain radiates into the legs on a  daily basis  Distribution to leg is posterior  She notes taking ibuprofen, but that calms down the pain, doesn't take it away  She has pain currently  Review of Systems   Constitutional: Negative  Gastrointestinal: Negative      Genitourinary: Negative  Musculoskeletal:        Sxs as in HPI   Skin: Negative  Neurological:        Radicular type sxs  Psychiatric/Behavioral: Negative  The following portions of the patient's history were reviewed and updated as appropriate: allergies, current medications, past family history, past medical history, past social history, past surgical history and problem list       Objective:     Physical Exam  Vitals reviewed  Constitutional:       General: She is not in acute distress  Appearance: Normal appearance  She is not ill-appearing or toxic-appearing  Cardiovascular:      Rate and Rhythm: Normal rate and regular rhythm  Pulmonary:      Effort: Pulmonary effort is normal    Musculoskeletal:      Cervical back: Neck supple  Lumbar back: Tenderness and bony tenderness present  Decreased range of motion (with pain in all directions, most noted forward flexion)  Negative right straight leg raise test and negative left straight leg raise test    Neurological:      Mental Status: She is alert and oriented to person, place, and time  Motor: Motor function is intact  No weakness  Coordination: Coordination is intact  Gait: Gait abnormal (slight antalgic gait noted with some guarding  )     Psychiatric:         Mood and Affect: Mood normal          Behavior: Behavior normal          Vitals:    09/14/21 0931   BP: 118/67   BP Location: Right arm   Patient Position: Sitting   Cuff Size: Large   Pulse: 92   Temp: (!) 97 2 °F (36 2 °C)   TempSrc: Temporal   Weight: 83 9 kg (185 lb)   Height: 5' 5" (1 651 m)

## 2021-09-16 ENCOUNTER — HOSPITAL ENCOUNTER (OUTPATIENT)
Dept: RADIOLOGY | Facility: HOSPITAL | Age: 36
Discharge: HOME | End: 2021-09-16

## 2021-09-16 DIAGNOSIS — M54.41 ACUTE BILATERAL LOW BACK PAIN WITH BILATERAL SCIATICA: ICD-10-CM

## 2021-09-16 DIAGNOSIS — M54.42 ACUTE BILATERAL LOW BACK PAIN WITH BILATERAL SCIATICA: ICD-10-CM

## 2021-09-16 PROCEDURE — 72110 X-RAY EXAM L-2 SPINE 4/>VWS: CPT

## 2022-02-05 ENCOUNTER — IMMUNIZATIONS (OUTPATIENT)
Dept: FAMILY MEDICINE CLINIC | Facility: HOSPITAL | Age: 37
End: 2022-02-05

## 2022-02-05 DIAGNOSIS — Z23 ENCOUNTER FOR IMMUNIZATION: Primary | ICD-10-CM

## 2022-02-05 PROCEDURE — 91300 COVID-19 PFIZER VACC 0.3 ML: CPT

## 2022-02-05 PROCEDURE — 0001A COVID-19 PFIZER VACC 0.3 ML: CPT
